# Patient Record
Sex: MALE | Race: WHITE
[De-identification: names, ages, dates, MRNs, and addresses within clinical notes are randomized per-mention and may not be internally consistent; named-entity substitution may affect disease eponyms.]

---

## 2019-04-04 ENCOUNTER — HOSPITAL ENCOUNTER (OUTPATIENT)
Dept: HOSPITAL 25 - OR | Age: 69
Discharge: HOME | End: 2019-04-04
Attending: SURGERY
Payer: COMMERCIAL

## 2019-04-04 VITALS — DIASTOLIC BLOOD PRESSURE: 72 MMHG | SYSTOLIC BLOOD PRESSURE: 124 MMHG

## 2019-04-04 DIAGNOSIS — K40.90: Primary | ICD-10-CM

## 2019-04-04 DIAGNOSIS — I10: ICD-10-CM

## 2019-04-04 DIAGNOSIS — C61: ICD-10-CM

## 2019-04-04 PROCEDURE — C1781 MESH (IMPLANTABLE): HCPCS

## 2019-04-04 NOTE — OP
Operative Report - Blank





- Operative Report


Date of Operation: 04/04/19


Note: 





Brief Operative Note


Preop Dx:  Left Inguinal Hernia


Postop Dx: same, indirect


Procedure: open repair LIH w/ mesh


Anesthesia: Local MAC


Surgeon: Jaylin


Assistant: GREGG Braxton


Fluids: 800 ml RL


EBL:  < 10 ml


Specimen: none


Drains: none


Findings: dictated

## 2019-04-04 NOTE — OP
CC:  Quinton Mosqueda MD; Dr. Alvarez

 

OPERATIVE REPORT:

 

DATE OF OPERATION:  04/04/19

 

DATE OF BIRTH:  [birth year]

 

SURGEON:  Quinton Mosqueda MD

 

ASSISTANT:  GREGG Morgan

 

ANESTHESIOLOGIST:  Dr. Brown

 

ANESTHESIA:  LMAC anesthesia.

 

PRE-OP DIAGNOSIS:  Left inguinal hernia.

 

POST-OP DIAGNOSIS:  Left inguinal hernia.

 

OPERATIVE PROCEDURE:  Open left inguinal hernia repair with mesh.

 

DESCRIPTION OF PROCEDURE:  The patient is supine on the operating room table after adequate intraveno
us sedation, compression stockings, Audrey Hugger warmer and intravenous antibiotics.  The left groin w
as clipped and prepped with antiseptic and draped in a sterile fashion.  Local infiltrative anesthesi
a was administered. Small left inguinal incision was created approximately 2 to 2.5 inches in size. D
issection was carried down to the external oblique, which was opened in the direction of its fibers. 
 Cord structures were encircled with a Penrose drain tented upward.  The indirect sac was dissected f
ree and reduced, it was a little fibrotic, but not really much trouble.  There was no direct space sa
c.  A cone mesh plug was placed into the internal ring, sutured there with 2-0 Vicryl.  A second piec
e of mesh was placed over the inguinal floor, sutured to the tubercle.  Tails were split, brought meeta
und the cord structures, tacked down laterally with 2-0 Vicryl.  External oblique was closed over top
 with 2-0 Vicryl, Marilu's with 3-0 Vicryl, skin with 4-0 Prolene followed by sterile dressing.  He t
olerated the procedure well and was awakened and brought to Recovery in good condition.  There were n
o complications.  No drains.  No pathologic specimens.  Sponge and instrument counts correct.  Estima
jose g blood loss as 20 mL.

 

 [id number]/[facility] #: [id number]